# Patient Record
Sex: MALE | Race: WHITE | Employment: UNEMPLOYED | URBAN - METROPOLITAN AREA
[De-identification: names, ages, dates, MRNs, and addresses within clinical notes are randomized per-mention and may not be internally consistent; named-entity substitution may affect disease eponyms.]

---

## 2018-01-01 ENCOUNTER — HOSPITAL ENCOUNTER (EMERGENCY)
Facility: HOSPITAL | Age: 53
End: 2018-01-01
Attending: EMERGENCY MEDICINE
Payer: OTHER GOVERNMENT

## 2018-01-01 ENCOUNTER — HOSPITAL ENCOUNTER (EMERGENCY)
Facility: HOSPITAL | Age: 53
Discharge: STILL ADMITTED OR ADMINISTRATIVE | End: 2018-01-01

## 2018-01-01 VITALS — OXYGEN SATURATION: 49 %

## 2018-01-01 DIAGNOSIS — I46.9 CARDIAC ARREST (HCC): Primary | ICD-10-CM

## 2018-01-01 PROCEDURE — 92950 HEART/LUNG RESUSCITATION CPR: CPT

## 2018-01-01 PROCEDURE — 76705 ECHO EXAM OF ABDOMEN: CPT

## 2018-01-01 PROCEDURE — 99285 EMERGENCY DEPT VISIT HI MDM: CPT
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
## 2018-10-16 NOTE — ED NOTES
Family left at this time and will work on  arrangements. Security re updated on information .  Belongings taken by family

## 2018-10-16 NOTE — ED NOTES
Aunt, uncle, and daughter at bedside viewing the pt.  called back and states that his primary pmd Vasiliy Flash has been notified of the time of death.  Per Lulu Garcia the  the pmd did do an EKG and called paramedics, the patient did refuse med

## 2018-10-16 NOTE — ED NOTES
[facility] called and will call me back in 1-2 hours. Spoke to Crowdery and given referral ivis [id number]. Pt is currently a tissue and cornea candidate at this time.

## 2018-10-16 NOTE — ED NOTES
called  And spoke to Siva. She is also speaking to the state police who continues to be here. Vic Combs spoke to larry who is the anthony.

## 2018-10-16 NOTE — ED PROVIDER NOTES
Patient Seen in: [facility] Emergency Department    History   Patient presents with:  Cardiac Arrest (cardiovascular)    Stated Complaint:     HPI    History is provided by EMS.     77-year-old male with unknown medical history, brought in by EMS a Verbal informed consent was not obtained. Pt was positioned and using the abdominal probe, a bedside ultrasound was performed by me. 4 separate views were obtained.       A hypoechoic stripe between the right kidney and liver was not seen  A hypoech above heroic efforts at resuscitation of this patient, return of spontaneous circulation was not successful. Dr. Connor Olson was notified of patient outcome.  Family enroute to the hospital    On family arrival - pt's fiance stated pt developed L sided ches

## 2018-10-16 NOTE — CODE DOCUMENTATION
Dr Carolann Bradley at bedside prior to arrival of pt.  Phlebotomy, CT, IR, santos, and respiratory at bedside prior to pts arrival